# Patient Record
Sex: FEMALE | Race: WHITE | NOT HISPANIC OR LATINO
[De-identification: names, ages, dates, MRNs, and addresses within clinical notes are randomized per-mention and may not be internally consistent; named-entity substitution may affect disease eponyms.]

---

## 2019-06-01 ENCOUNTER — APPOINTMENT (OUTPATIENT)
Dept: CT IMAGING | Facility: HOSPITAL | Age: 59
End: 2019-06-01
Payer: COMMERCIAL

## 2019-06-01 ENCOUNTER — OUTPATIENT (OUTPATIENT)
Dept: OUTPATIENT SERVICES | Facility: HOSPITAL | Age: 59
LOS: 1 days | End: 2019-06-01
Payer: COMMERCIAL

## 2019-06-01 PROCEDURE — 70480 CT ORBIT/EAR/FOSSA W/O DYE: CPT | Mod: 26

## 2019-06-01 PROCEDURE — 70480 CT ORBIT/EAR/FOSSA W/O DYE: CPT

## 2021-01-05 ENCOUNTER — TRANSCRIPTION ENCOUNTER (OUTPATIENT)
Age: 61
End: 2021-01-05

## 2024-11-26 ENCOUNTER — OUTPATIENT (OUTPATIENT)
Dept: OUTPATIENT SERVICES | Facility: HOSPITAL | Age: 64
LOS: 1 days | End: 2024-11-26

## 2024-11-26 VITALS
RESPIRATION RATE: 16 BRPM | WEIGHT: 149.91 LBS | TEMPERATURE: 98 F | HEART RATE: 62 BPM | HEIGHT: 62.5 IN | DIASTOLIC BLOOD PRESSURE: 85 MMHG | SYSTOLIC BLOOD PRESSURE: 128 MMHG | OXYGEN SATURATION: 98 %

## 2024-11-26 DIAGNOSIS — E89.0 POSTPROCEDURAL HYPOTHYROIDISM: Chronic | ICD-10-CM

## 2024-11-26 DIAGNOSIS — Z85.3 PERSONAL HISTORY OF MALIGNANT NEOPLASM OF BREAST: ICD-10-CM

## 2024-11-26 DIAGNOSIS — T85.43XA LEAKAGE OF BREAST PROSTHESIS AND IMPLANT, INITIAL ENCOUNTER: ICD-10-CM

## 2024-11-26 DIAGNOSIS — I10 ESSENTIAL (PRIMARY) HYPERTENSION: ICD-10-CM

## 2024-11-26 DIAGNOSIS — Z90.13 ACQUIRED ABSENCE OF BILATERAL BREASTS AND NIPPLES: Chronic | ICD-10-CM

## 2024-11-26 DIAGNOSIS — Z90.710 ACQUIRED ABSENCE OF BOTH CERVIX AND UTERUS: Chronic | ICD-10-CM

## 2024-11-26 DIAGNOSIS — Z90.5 ACQUIRED ABSENCE OF KIDNEY: Chronic | ICD-10-CM

## 2024-11-26 DIAGNOSIS — Z85.9 PERSONAL HISTORY OF MALIGNANT NEOPLASM, UNSPECIFIED: ICD-10-CM

## 2024-11-26 NOTE — H&P PST ADULT - PROBLEM SELECTOR PLAN 2
Pt will be called and told of reschedule of surgery -   Request Dr Mooney to fax preop instructions for anesthesia regarding IV infusion of Lanreotide ( anesthesia has already spoken with surgeon and MD as per pt)   Request Cardio Echo / Stress done for patient by [facility] /Dr Mooney

## 2024-11-26 NOTE — H&P PST ADULT - GENITOURINARY COMMENTS
Patient contacted back, is aware and is going to come by for samples and then find out from insurance what is preferred    Hx Kidney ca - 2013 - Right partial nephrectomy not examined

## 2024-11-26 NOTE — H&P PST ADULT - HISTORY OF PRESENT ILLNESS
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

## 2024-11-26 NOTE — H&P PST ADULT - LAST ECHOCARDIOGRAM
Normal rate, regular rhythm.  Heart sounds S1, S2.  No murmurs, rubs or gallops. 2024 - done when Dx of neuro endocrine 2024 - done when Dx of neuro endocrine Ca

## 2024-11-26 NOTE — H&P PST ADULT - NSICDXPASTMEDICALHX_GEN_ALL_CORE_FT
PAST MEDICAL HISTORY:  Breast CA     Neuroendocrine tumor     Rupture of implant of left breast      PAST MEDICAL HISTORY:  Breast CA     Cancer of kidney     Gout     HTN (hypertension)     Hypothyroid     Neuroendocrine tumor     Rupture of implant of left breast

## 2024-11-26 NOTE — H&P PST ADULT - CARDIOVASCULAR COMMENTS
Pt referred for Cardio w/u with Echo and stress when Dx with neuroendocrine ca 2023 - results were "normal"

## 2024-11-26 NOTE — H&P PST ADULT - ENDOCRINE COMMENTS
Hx thyroidectomy for pre-ca cells - goiter / hashimotos 2010 - stable on dose - Hx thyroidectomy for pre-ca cells - goiter / hashimotos 2010 - Hypothyroid - stable on dose - Hx neuroendocrine ca Dx 2023 Stage IV and affecting liver -pt treated through [facility] program referral Dr Mooney

## 2024-11-26 NOTE — H&P PST ADULT - NSICDXPASTSURGICALHX_GEN_ALL_CORE_FT
PAST SURGICAL HISTORY:  H/O partial nephrectomy     H/O thyroidectomy     H/O: hysterectomy     History of bilateral mastectomy

## 2024-11-26 NOTE — H&P PST ADULT - PROBLEM SELECTOR PLAN 1
Pt scheduled for surgery and preop instructions including instructions for taking Famotidine and for Chlorhexidine use in showering on the day of surgery, given verbally and with use of  written materials, and patient confirming understanding of such instructions using  teach back method.  Call to Dr Cameron for confirmation of BW done 2 weeks ago - fax to [phone] - cbc and cmp with EKG

## 2024-11-26 NOTE — H&P PST ADULT - OTHER CARE PROVIDERS
Dr Mooney [phone] (neuroendocrine ) Dr Mooney [phone] (neuroendocrine ) Marie ([facility] program contact - [phone])

## 2024-12-03 NOTE — ASU PATIENT PROFILE, ADULT - AS SC BRADEN FRICTION
Phoned in olmesartan 40 mg #90  One tablet daily with 3 refills called in to [facility].   (3) no apparent problem

## 2024-12-03 NOTE — ASU PATIENT PROFILE, ADULT - NSICDXPASTMEDICALHX_GEN_ALL_CORE_FT
PAST MEDICAL HISTORY:  Breast CA     Cancer of kidney     Gout     HTN (hypertension)     Hypothyroid     Neuroendocrine tumor     Rupture of implant of left breast

## 2024-12-04 ENCOUNTER — TRANSCRIPTION ENCOUNTER (OUTPATIENT)
Age: 64
End: 2024-12-04

## 2024-12-04 ENCOUNTER — OUTPATIENT (OUTPATIENT)
Dept: INPATIENT UNIT | Facility: HOSPITAL | Age: 64
LOS: 1 days | Discharge: HOME | End: 2024-12-04

## 2024-12-04 VITALS
OXYGEN SATURATION: 100 % | HEIGHT: 62 IN | RESPIRATION RATE: 18 BRPM | HEART RATE: 59 BPM | WEIGHT: 199.96 LBS | SYSTOLIC BLOOD PRESSURE: 143 MMHG | DIASTOLIC BLOOD PRESSURE: 73 MMHG | TEMPERATURE: 98 F

## 2024-12-04 VITALS
RESPIRATION RATE: 16 BRPM | OXYGEN SATURATION: 97 % | TEMPERATURE: 98 F | DIASTOLIC BLOOD PRESSURE: 70 MMHG | HEART RATE: 56 BPM | SYSTOLIC BLOOD PRESSURE: 113 MMHG

## 2024-12-04 DIAGNOSIS — Z90.710 ACQUIRED ABSENCE OF BOTH CERVIX AND UTERUS: Chronic | ICD-10-CM

## 2024-12-04 DIAGNOSIS — Z90.13 ACQUIRED ABSENCE OF BILATERAL BREASTS AND NIPPLES: Chronic | ICD-10-CM

## 2024-12-04 DIAGNOSIS — Z85.3 PERSONAL HISTORY OF MALIGNANT NEOPLASM OF BREAST: ICD-10-CM

## 2024-12-04 DIAGNOSIS — E89.0 POSTPROCEDURAL HYPOTHYROIDISM: Chronic | ICD-10-CM

## 2024-12-04 DIAGNOSIS — Z90.5 ACQUIRED ABSENCE OF KIDNEY: Chronic | ICD-10-CM

## 2024-12-04 PROCEDURE — 88305 TISSUE EXAM BY PATHOLOGIST: CPT | Mod: 26

## 2024-12-04 DEVICE — IMPLANTABLE DEVICE
Type: IMPLANTABLE DEVICE | Site: BILATERAL | Status: NON-FUNCTIONAL
Removed: 2024-12-04

## 2024-12-04 RX ORDER — NEBIVOLOL HYDROCHLORIDE 10 MG/1
1 TABLET ORAL
Refills: 0 | DISCHARGE

## 2024-12-04 RX ORDER — HYDROMORPHONE HYDROCHLORIDE 2 MG/1
0.5 TABLET ORAL
Refills: 0 | Status: DISCONTINUED | OUTPATIENT
Start: 2024-12-04 | End: 2024-12-04

## 2024-12-04 RX ORDER — LEVOTHYROXINE SODIUM 150 MCG
1 TABLET ORAL
Refills: 0 | DISCHARGE

## 2024-12-04 RX ORDER — EXAMETAZIME 0.5 MG
25 KIT INTRAVENOUS
Refills: 0 | DISCHARGE

## 2024-12-04 RX ORDER — COLCHICINE 0.6 MG
1 TABLET ORAL
Refills: 0 | DISCHARGE

## 2024-12-04 RX ORDER — ALBUTEROL 90 MCG
2 AEROSOL (GRAM) INHALATION
Refills: 0 | DISCHARGE

## 2024-12-04 RX ORDER — OCTREOTIDE ACETATE 500 UG/ML
100 INJECTION, SOLUTION INTRAVENOUS; SUBCUTANEOUS ONCE
Refills: 0 | Status: DISCONTINUED | OUTPATIENT
Start: 2024-12-04 | End: 2024-12-18

## 2024-12-04 RX ORDER — FENTANYL 12 UG/H
25 PATCH, EXTENDED RELEASE TRANSDERMAL
Refills: 0 | Status: DISCONTINUED | OUTPATIENT
Start: 2024-12-04 | End: 2024-12-04

## 2024-12-04 RX ORDER — AMLODIPINE BESYLATE 10 MG/1
1 TABLET ORAL
Refills: 0 | DISCHARGE

## 2024-12-04 RX ORDER — OCTREOTIDE ACETATE 500 UG/ML
100 INJECTION, SOLUTION INTRAVENOUS; SUBCUTANEOUS
Qty: 500 | Refills: 0 | Status: DISCONTINUED | OUTPATIENT
Start: 2024-12-04 | End: 2024-12-11

## 2024-12-04 RX ORDER — LANREOTIDE ACETATE 120 MG/.5ML
1 INJECTION SUBCUTANEOUS
Refills: 0 | DISCHARGE

## 2024-12-04 RX ORDER — 0.9 % SODIUM CHLORIDE 0.9 %
1000 INTRAVENOUS SOLUTION INTRAVENOUS
Refills: 0 | Status: DISCONTINUED | OUTPATIENT
Start: 2024-12-04 | End: 2024-12-18

## 2024-12-04 NOTE — BRIEF OPERATIVE NOTE - OPERATION/FINDINGS
bilateral breast implant exchange with 490cc implants, fat grafting from abdomen to bilateral breasts

## 2024-12-04 NOTE — ASU DISCHARGE PLAN (ADULT/PEDIATRIC) - CARE PROVIDER_API CALL
Jesse Chua  Plastic Surgery  [address], [address]  [place] [address]  Phone: [phone]  Fax: [phone]  Established Patient  Follow Up Time:

## 2024-12-04 NOTE — ASU DISCHARGE PLAN (ADULT/PEDIATRIC) - FINANCIAL ASSISTANCE
[facility] provides services at a reduced cost to those who are determined to be eligible through [facility]’s financial assistance program. Information regarding [facility]’s financial assistance program can be found by going to [email or web address] or by calling [phone].

## 2024-12-04 NOTE — ASU DISCHARGE PLAN (ADULT/PEDIATRIC) - NS MD DC FALL RISK RISK
For information on Fall & Injury Prevention, visit: [email or web address] OR  [email or web address] OR  https://www.cdc.gov/steadi/patient.html

## 2024-12-04 NOTE — BRIEF OPERATIVE NOTE - NSICDXBRIEFPROCEDURE_GEN_ALL_CORE_FT
PROCEDURES:  Removal of both breasts with insertion of breast implants 04-Dec-2024 12:16:59  Luis Good  Fat grafting 04-Dec-2024 12:17:05  Luis Good

## 2024-12-24 LAB — SURGICAL PATHOLOGY STUDY: SIGNIFICANT CHANGE UP

## (undated) DEVICE — NDL SAFETY BUTTERFLY 23G X 3/4

## (undated) DEVICE — TUBING SUCTION NONCONDUCTIVE 6MM X 12FT

## (undated) DEVICE — GOWN LG

## (undated) DEVICE — DRAPE LAPAROTOMY TRANSVERSE

## (undated) DEVICE — SOL IRR POUR H2O 1500ML

## (undated) DEVICE — PACK BASIN SET

## (undated) DEVICE — GLV 7 PROTEXIS (WHITE)

## (undated) DEVICE — VENODYNE/SCD SLEEVE CALF MEDIUM

## (undated) DEVICE — DRSG TAPE MICROFOAM 4"

## (undated) DEVICE — LAP PAD W RING 18 X 18"

## (undated) DEVICE — MARKING PEN W RULER

## (undated) DEVICE — KIT LIPOGRAFTER FAT GRAFT

## (undated) DEVICE — TUBING IRR SET FOR CYSTOSCOPY 77"

## (undated) DEVICE — TUBING INFILTRATION

## (undated) DEVICE — DRSG STERISTRIPS 0.5 X 4"

## (undated) DEVICE — PACK MAJOR ABDOMINAL WITH LAP

## (undated) DEVICE — STRYKER PULSE LAVAGE WITH COAXIAL FAN SPRAY TIP

## (undated) DEVICE — Device

## (undated) DEVICE — SUT MONOCRYL 3-0 18" PS-2 UNDYED

## (undated) DEVICE — LONE STAR RETRACTOR SQUARE 14.1CMX14.1CM DISP

## (undated) DEVICE — DRSG CURITY GAUZE SPONGE 4 X 4" 12-PLY

## (undated) DEVICE — SOL IRR POUR NS 0.9% 1500ML

## (undated) DEVICE — DRSG XEROFORM 5 X 9"

## (undated) DEVICE — LONE STAR ELASTIC STAY HOOK 12MM BLUNT

## (undated) DEVICE — LABELS BLANK W PEN

## (undated) DEVICE — SYR LUER LOK 50CC

## (undated) DEVICE — FOLEY TRAY 16FR 5CC LF UMETER CLOSED

## (undated) DEVICE — POSITIONER STRAP ARMBOARD VELCRO TS-30

## (undated) DEVICE — STAPLER SKIN MULTI DIRECTION W35

## (undated) DEVICE — DRAPE 3/4 SHEET 52X76"